# Patient Record
Sex: MALE | URBAN - METROPOLITAN AREA
[De-identification: names, ages, dates, MRNs, and addresses within clinical notes are randomized per-mention and may not be internally consistent; named-entity substitution may affect disease eponyms.]

---

## 2018-07-23 ENCOUNTER — APPOINTMENT (OUTPATIENT)
Dept: URBAN - METROPOLITAN AREA CLINIC 319 | Age: 63
Setting detail: DERMATOLOGY
End: 2018-07-23

## 2018-07-23 PROBLEM — C44.319 BASAL CELL CARCINOMA OF SKIN OF OTHER PARTS OF FACE: Status: ACTIVE | Noted: 2018-07-23

## 2018-07-23 PROCEDURE — 17311 MOHS 1 STAGE H/N/HF/G: CPT

## 2018-07-23 PROCEDURE — 13132 CMPLX RPR F/C/C/M/N/AX/G/H/F: CPT

## 2018-07-23 PROCEDURE — OTHER MOHS SURGERY: OTHER

## 2019-07-10 NOTE — PROCEDURE: MOHS SURGERY
Chief Complaint   Patient presents with    Follow-up     [facility]     Patient is here with parents for f/u [facility] and [address]      1. Have you been to the ER, urgent care clinic since your last visit? Hospitalized since your last visit? Yes Where: [facility]    2. Have you seen or consulted any other health care providers outside of the [address] [place] [address] since your last visit? Include any pap smears or colon screening.  No Primary Defect Length In Cm (Final Defect Size - Required For Flaps/Grafts): 1.4